# Patient Record
Sex: MALE | Race: WHITE | Employment: FULL TIME | URBAN - NONMETROPOLITAN AREA
[De-identification: names, ages, dates, MRNs, and addresses within clinical notes are randomized per-mention and may not be internally consistent; named-entity substitution may affect disease eponyms.]

---

## 2024-05-15 ENCOUNTER — HOSPITAL ENCOUNTER (EMERGENCY)
Age: 29
Discharge: ANOTHER HOSPITAL | End: 2024-05-15
Attending: EMERGENCY MEDICINE
Payer: COMMERCIAL

## 2024-05-15 ENCOUNTER — APPOINTMENT (OUTPATIENT)
Dept: GENERAL RADIOLOGY | Age: 29
End: 2024-05-15
Payer: COMMERCIAL

## 2024-05-15 VITALS
OXYGEN SATURATION: 99 % | BODY MASS INDEX: 29.4 KG/M2 | SYSTOLIC BLOOD PRESSURE: 140 MMHG | HEIGHT: 71 IN | WEIGHT: 210 LBS | DIASTOLIC BLOOD PRESSURE: 60 MMHG | TEMPERATURE: 98 F | HEART RATE: 71 BPM | RESPIRATION RATE: 16 BRPM

## 2024-05-15 DIAGNOSIS — S67.22XA: Primary | ICD-10-CM

## 2024-05-15 DIAGNOSIS — S69.92XA INJURY OF LEFT HAND, INITIAL ENCOUNTER: ICD-10-CM

## 2024-05-15 PROBLEM — S62.613B: Status: ACTIVE | Noted: 2024-05-15

## 2024-05-15 PROBLEM — S62.615B: Status: ACTIVE | Noted: 2024-05-15

## 2024-05-15 PROBLEM — S62.617B OPEN DISPLACED FRACTURE OF PROXIMAL PHALANX OF LEFT LITTLE FINGER: Status: ACTIVE | Noted: 2024-05-15

## 2024-05-15 LAB
ABO: NORMAL
ALBUMIN SERPL BCG-MCNC: 4.5 G/DL (ref 3.5–5.1)
ALP SERPL-CCNC: 64 U/L (ref 38–126)
ALT SERPL W/O P-5'-P-CCNC: 16 U/L (ref 11–66)
ANION GAP SERPL CALC-SCNC: 13 MEQ/L (ref 8–16)
ANTIBODY SCREEN: NORMAL
APTT PPP: 20.5 SECONDS (ref 22–38)
AST SERPL-CCNC: 20 U/L (ref 5–40)
BASOPHILS ABSOLUTE: 0 THOU/MM3 (ref 0–0.1)
BASOPHILS NFR BLD AUTO: 0.3 %
BILIRUB SERPL-MCNC: 1.7 MG/DL (ref 0.3–1.2)
BUN SERPL-MCNC: 20 MG/DL (ref 7–22)
CALCIUM SERPL-MCNC: 9 MG/DL (ref 8.5–10.5)
CHLORIDE SERPL-SCNC: 104 MEQ/L (ref 98–111)
CO2 SERPL-SCNC: 23 MEQ/L (ref 23–33)
CREAT SERPL-MCNC: 0.7 MG/DL (ref 0.4–1.2)
DEPRECATED RDW RBC AUTO: 35.4 FL (ref 35–45)
EOSINOPHIL NFR BLD AUTO: 0.7 %
EOSINOPHILS ABSOLUTE: 0.1 THOU/MM3 (ref 0–0.4)
ERYTHROCYTE [DISTWIDTH] IN BLOOD BY AUTOMATED COUNT: 12 % (ref 11.5–14.5)
GFR SERPL CREATININE-BSD FRML MDRD: > 90 ML/MIN/1.73M2
GLUCOSE SERPL-MCNC: 101 MG/DL (ref 70–108)
HCT VFR BLD AUTO: 39.1 % (ref 42–52)
HGB BLD-MCNC: 13.8 GM/DL (ref 14–18)
IMM GRANULOCYTES # BLD AUTO: 0.03 THOU/MM3 (ref 0–0.07)
IMM GRANULOCYTES NFR BLD AUTO: 0.3 %
INR PPP: 0.92 (ref 0.85–1.13)
LYMPHOCYTES ABSOLUTE: 1.5 THOU/MM3 (ref 1–4.8)
LYMPHOCYTES NFR BLD AUTO: 15.6 %
MCH RBC QN AUTO: 29.4 PG (ref 26–33)
MCHC RBC AUTO-ENTMCNC: 35.3 GM/DL (ref 32.2–35.5)
MCV RBC AUTO: 83.4 FL (ref 80–94)
MONOCYTES ABSOLUTE: 0.5 THOU/MM3 (ref 0.4–1.3)
MONOCYTES NFR BLD AUTO: 5.6 %
NEUTROPHILS ABSOLUTE: 7.4 THOU/MM3 (ref 1.8–7.7)
NEUTROPHILS NFR BLD AUTO: 77.5 %
NRBC BLD AUTO-RTO: 0 /100 WBC
OSMOLALITY SERPL CALC.SUM OF ELEC: 282.2 MOSMOL/KG (ref 275–300)
PLATELET # BLD AUTO: 221 THOU/MM3 (ref 130–400)
PMV BLD AUTO: 9.5 FL (ref 9.4–12.4)
POTASSIUM SERPL-SCNC: 3.9 MEQ/L (ref 3.5–5.2)
PROT SERPL-MCNC: 7.3 G/DL (ref 6.1–8)
RBC # BLD AUTO: 4.69 MILL/MM3 (ref 4.7–6.1)
RH FACTOR: NORMAL
SODIUM SERPL-SCNC: 140 MEQ/L (ref 135–145)
WBC # BLD AUTO: 9.5 THOU/MM3 (ref 4.8–10.8)

## 2024-05-15 PROCEDURE — 86901 BLOOD TYPING SEROLOGIC RH(D): CPT

## 2024-05-15 PROCEDURE — 36415 COLL VENOUS BLD VENIPUNCTURE: CPT

## 2024-05-15 PROCEDURE — 85610 PROTHROMBIN TIME: CPT

## 2024-05-15 PROCEDURE — 2580000003 HC RX 258: Performed by: EMERGENCY MEDICINE

## 2024-05-15 PROCEDURE — 96365 THER/PROPH/DIAG IV INF INIT: CPT

## 2024-05-15 PROCEDURE — 6360000002 HC RX W HCPCS: Performed by: EMERGENCY MEDICINE

## 2024-05-15 PROCEDURE — 96375 TX/PRO/DX INJ NEW DRUG ADDON: CPT

## 2024-05-15 PROCEDURE — 96376 TX/PRO/DX INJ SAME DRUG ADON: CPT

## 2024-05-15 PROCEDURE — 85025 COMPLETE CBC W/AUTO DIFF WBC: CPT

## 2024-05-15 PROCEDURE — 73120 X-RAY EXAM OF HAND: CPT

## 2024-05-15 PROCEDURE — 86850 RBC ANTIBODY SCREEN: CPT

## 2024-05-15 PROCEDURE — 73100 X-RAY EXAM OF WRIST: CPT

## 2024-05-15 PROCEDURE — 85730 THROMBOPLASTIN TIME PARTIAL: CPT

## 2024-05-15 PROCEDURE — 86900 BLOOD TYPING SEROLOGIC ABO: CPT

## 2024-05-15 PROCEDURE — 80053 COMPREHEN METABOLIC PANEL: CPT

## 2024-05-15 PROCEDURE — 93005 ELECTROCARDIOGRAM TRACING: CPT | Performed by: EMERGENCY MEDICINE

## 2024-05-15 PROCEDURE — 99285 EMERGENCY DEPT VISIT HI MDM: CPT

## 2024-05-15 PROCEDURE — APPNB30 APP NON BILLABLE TIME 0-30 MINS: Performed by: OCCUPATIONAL THERAPIST

## 2024-05-15 PROCEDURE — 6820000003 HC L2 TRAUMA ALERT ACTIVATION: Performed by: SURGERY

## 2024-05-15 PROCEDURE — 90715 TDAP VACCINE 7 YRS/> IM: CPT | Performed by: EMERGENCY MEDICINE

## 2024-05-15 PROCEDURE — 90471 IMMUNIZATION ADMIN: CPT | Performed by: EMERGENCY MEDICINE

## 2024-05-15 RX ORDER — ONDANSETRON 2 MG/ML
4 INJECTION INTRAMUSCULAR; INTRAVENOUS ONCE
Status: COMPLETED | OUTPATIENT
Start: 2024-05-15 | End: 2024-05-15

## 2024-05-15 RX ORDER — 0.9 % SODIUM CHLORIDE 0.9 %
1000 INTRAVENOUS SOLUTION INTRAVENOUS ONCE
Status: COMPLETED | OUTPATIENT
Start: 2024-05-15 | End: 2024-05-15

## 2024-05-15 RX ADMIN — TETANUS TOXOID, REDUCED DIPHTHERIA TOXOID AND ACELLULAR PERTUSSIS VACCINE, ADSORBED 0.5 ML: 5; 2.5; 8; 8; 2.5 SUSPENSION INTRAMUSCULAR at 14:23

## 2024-05-15 RX ADMIN — HYDROMORPHONE HYDROCHLORIDE 1 MG: 1 INJECTION, SOLUTION INTRAMUSCULAR; INTRAVENOUS; SUBCUTANEOUS at 16:27

## 2024-05-15 RX ADMIN — HYDROMORPHONE HYDROCHLORIDE 1 MG: 1 INJECTION, SOLUTION INTRAMUSCULAR; INTRAVENOUS; SUBCUTANEOUS at 14:20

## 2024-05-15 RX ADMIN — ONDANSETRON 4 MG: 2 INJECTION INTRAMUSCULAR; INTRAVENOUS at 14:20

## 2024-05-15 RX ADMIN — WATER 2000 MG: 1 INJECTION INTRAMUSCULAR; INTRAVENOUS; SUBCUTANEOUS at 14:23

## 2024-05-15 RX ADMIN — GENTAMICIN SULFATE 476.4 MG: 40 INJECTION, SOLUTION INTRAMUSCULAR; INTRAVENOUS at 15:16

## 2024-05-15 RX ADMIN — SODIUM CHLORIDE 1000 ML: 9 INJECTION, SOLUTION INTRAVENOUS at 14:25

## 2024-05-15 RX ADMIN — HYDROMORPHONE HYDROCHLORIDE 1 MG: 1 INJECTION, SOLUTION INTRAMUSCULAR; INTRAVENOUS; SUBCUTANEOUS at 15:17

## 2024-05-15 ASSESSMENT — PAIN SCALES - GENERAL: PAINLEVEL_OUTOF10: 7

## 2024-05-15 ASSESSMENT — PAIN DESCRIPTION - ORIENTATION: ORIENTATION: LEFT

## 2024-05-15 ASSESSMENT — ENCOUNTER SYMPTOMS
COLOR CHANGE: 0
NAUSEA: 0
COUGH: 0
ABDOMINAL PAIN: 0
VOMITING: 0
SHORTNESS OF BREATH: 0
EYE PAIN: 0

## 2024-05-15 ASSESSMENT — PAIN DESCRIPTION - LOCATION: LOCATION: HAND

## 2024-05-15 ASSESSMENT — PAIN DESCRIPTION - PAIN TYPE: TYPE: ACUTE PAIN

## 2024-05-15 ASSESSMENT — PAIN - FUNCTIONAL ASSESSMENT: PAIN_FUNCTIONAL_ASSESSMENT: 0-10

## 2024-05-15 NOTE — ED TRIAGE NOTES
Pt reports to ED via [facility] with c/c crush injury to left hand. Pt reportedly had c-coil machine smash into left hand. Weight of crushing object approx 150 lb. Pt left hand mangled. Pt left 4th digit pale in color. Pt reports sensation in left thump and 2nd digit. Pt given 200 mcg fentanyl PTA. Level 1 trauma paged per Dr. Neha saldana.

## 2024-05-15 NOTE — ED PROVIDER NOTES
PATIENT NAME: Jonathan Reyna  MRN: [id number]  : 1995  SAWYER: 5/15/2024    I performed a history and physical examination of the patient and discussed management with the Resident. I reviewed the Resident's note and agree with the documented findings and plan of care. Any areas of disagreement are noted on the chart. I was personally present for the key portions of any procedures and have documented in the chart those procedures where I was not present during the key portions. I have reviewed the emergency nurses triage note and agree with the chief complaint, past medical history, past surgical history, allergies, medications, social and family history as documented unless otherwise noted below.    MEDICAL DEDISION MAKING (MDM)     Jonathan Reyna is a 28 y.o. male who presents to Emergency Department with Hand Injury (LEFT)     Patient presents to ED by EMS for a left hand crush injury which happened just PTA.  According to him, his hand was smashed by the pilon of the C coiling machine.  Left hand is mangled see below picture.  Pain is rated at 10/10.  No active bleeding.    Physical exam: AVSS, heart and lungs unremarkable.  Soft abdomen.  Left hand exam as below picture.  Of note left thumb is spared without injury, left fourth finger is pale and cool.    Left hand x-ray: Near complete amputation injuries involving the proximal left third through fifth digits with associated comminuted fractures as detailed above. Scattered punctate radiopaque foreign densities are also seen throughout all 5 digits of the left hand.    Level 1 trauma activation.  Discussed with Ortho immediately.  Ortho reviewed patient's pictures and x-rays, unfortunately, we have no hand surgery available at [facility] today.  Recommending [facility] transfer.    ED treatment include NPO, multiple IV medication for pain control, tetanus booster, IV antibiotics and brief left hand immobilization on a hard board.    Accepted by [facility] ED as an ED-ED transfer  recognition program.  Efforts were made to edit the dictations but occasionally words aremis-transcribed.)    MD Neha Payne Jian, MD  05/15/24 [id number]

## 2024-05-15 NOTE — CONSULTS
Trauma Consult     Patient:  Jonathan Reyna  Admit date: 5/15/2024   YOB: 1995 Date of Evaluation: 5/15/2024  MRN: [id number]  Acct: [id number]    Injury Date:5/15/24  Injury time:afternoon  PCP: No primary care provider on file.   Referring physician: Dr. Solomon    Time of Trauma Surgeon Notification:  14:08  Time of JOSE Arrival: 14:14  Time of Trauma Surgeon Arrival:  14:17    Assessment:    Active Problems:    Crushing injury of hand and fingers, left, initial encounter    Open displaced fracture of proximal phalanx of left little finger    Displaced fracture of proximal phalanx of left ring finger, initial encounter for open fracture    Displaced fracture of proximal phalanx of left middle finger, initial encounter for open fracture  Resolved Problems:    * No resolved hospital problems. *    Plan:    Pt seen and worked up as a level 1 trauma. Refer to MDM below for any imaging, procedures or treatments provided. Pt sustained injuries including traumatic crush injury of left non dominant hand to 2-5 digits, including near amputation injuries to proximal and mid 3-5 digits, comminuted displaced open fractures to the 3-4 digits. Ortho was immediately contacted by ER staff and with no hand surgeon on call they recommend transfer to [facility]. ER to assist with helicopter/ transport to accepting hospital. Pt requires specialist care beyond this facility. Pt stabilized with pain control, IVF, IV abx, tetanus splinting and hemorrhage control interventions.     Activation:    [x] Level I (Trauma Alert)   [] Level II (Injury Call)   [] Level III (Trauma Consult)   [] Downgraded (Time: )   Mode of Arrival: EMS transportation  Referring Facility:   Loss of Consciousness [x]No []Yes[]Unknown  Duration(min):  Mechanism of Injury:  []Motor Vehicle crash   []Single Vehicle [] []Passenger []Scene Fatality []Front Seat  []Restrained   []Air Bag Deployed   []Ejected []Rollover []Pedestrian []Trapped   Type of  intervention. Pt with injury complexity beyond ortho on call capabilities. While in ER pt received dilaudid, tetanus update, IV ancef, IV gentamicin and IVF. Splint and dressing was applied, with minimal success ER physician attempted to reduce 3rd finger which appears to be NV intact as pt can move 3rd digit and tendon is pulling into flexion. Pain adequately controlled with dilaudid. Patient to be life flighted. Later via nurse notified pt is going via MICU as life flight declined for weather.     Radiology:     XR WRIST LEFT (2 VIEWS)   Final Result      Left hand:   1. Near complete amputation injuries involving the proximal left third through   fifth digits with associated comminuted fractures as detailed above. Scattered   punctate radiopaque foreign densities are also seen throughout all 5 digits of   the left hand.      Left wrist:   1. No acute fracture of the left wrist is seen.      **This report has been created using voice recognition software.  It may contain   minor errors which are inherent in voice recognition technology.**      XR HAND LEFT (2 VIEWS)   Final Result      Left hand:   1. Near complete amputation injuries involving the proximal left third through   fifth digits with associated comminuted fractures as detailed above. Scattered   punctate radiopaque foreign densities are also seen throughout all 5 digits of   the left hand.      Left wrist:   1. No acute fracture of the left wrist is seen.      **This report has been created using voice recognition software.  It may contain   minor errors which are inherent in voice recognition technology.**        Fast Exam: No      Total time spent in care of patient:  35 minutes collectively between subjective/objective examination, chart review, documentation, clinical reasoning and discussion with attending regarding plan/interval changes.    Patient seen and examined independently by me 5/16/2024     I personally supervised the PA/NP in the

## 2024-05-16 LAB
EKG ATRIAL RATE: 74 BPM
EKG P AXIS: 54 DEGREES
EKG P-R INTERVAL: 152 MS
EKG Q-T INTERVAL: 370 MS
EKG QRS DURATION: 82 MS
EKG QTC CALCULATION (BAZETT): 410 MS
EKG R AXIS: 52 DEGREES
EKG T AXIS: 29 DEGREES
EKG VENTRICULAR RATE: 74 BPM

## 2024-05-16 PROCEDURE — 93010 ELECTROCARDIOGRAM REPORT: CPT | Performed by: INTERNAL MEDICINE

## 2025-04-30 ENCOUNTER — OFFICE VISIT (OUTPATIENT)
Dept: PRIMARY CARE CLINIC | Age: 30
End: 2025-04-30
Payer: COMMERCIAL

## 2025-04-30 VITALS
SYSTOLIC BLOOD PRESSURE: 116 MMHG | BODY MASS INDEX: 34.55 KG/M2 | WEIGHT: 246.8 LBS | HEIGHT: 71 IN | DIASTOLIC BLOOD PRESSURE: 66 MMHG | HEART RATE: 66 BPM | OXYGEN SATURATION: 97 %

## 2025-04-30 DIAGNOSIS — Z13.6 ENCOUNTER FOR LIPID SCREENING FOR CARDIOVASCULAR DISEASE: ICD-10-CM

## 2025-04-30 DIAGNOSIS — F90.0 ATTENTION DEFICIT HYPERACTIVITY DISORDER (ADHD), PREDOMINANTLY INATTENTIVE TYPE: Primary | ICD-10-CM

## 2025-04-30 DIAGNOSIS — Z13.220 ENCOUNTER FOR LIPID SCREENING FOR CARDIOVASCULAR DISEASE: ICD-10-CM

## 2025-04-30 PROCEDURE — 80305 DRUG TEST PRSMV DIR OPT OBS: CPT | Performed by: PHYSICIAN ASSISTANT

## 2025-04-30 PROCEDURE — G8417 CALC BMI ABV UP PARAM F/U: HCPCS | Performed by: PHYSICIAN ASSISTANT

## 2025-04-30 PROCEDURE — G8427 DOCREV CUR MEDS BY ELIG CLIN: HCPCS | Performed by: PHYSICIAN ASSISTANT

## 2025-04-30 PROCEDURE — 1036F TOBACCO NON-USER: CPT | Performed by: PHYSICIAN ASSISTANT

## 2025-04-30 PROCEDURE — 99204 OFFICE O/P NEW MOD 45 MIN: CPT | Performed by: PHYSICIAN ASSISTANT

## 2025-04-30 RX ORDER — ALPRAZOLAM 0.5 MG
0.5 TABLET ORAL ONCE
COMMUNITY
Start: 2024-06-05 | End: 2025-04-30

## 2025-04-30 RX ORDER — DEXTROAMPHETAMINE SACCHARATE, AMPHETAMINE ASPARTATE MONOHYDRATE, DEXTROAMPHETAMINE SULFATE AND AMPHETAMINE SULFATE 2.5; 2.5; 2.5; 2.5 MG/1; MG/1; MG/1; MG/1
10 CAPSULE, EXTENDED RELEASE ORAL DAILY
Qty: 30 CAPSULE | Refills: 0 | Status: SHIPPED | OUTPATIENT
Start: 2025-04-30 | End: 2025-05-30

## 2025-04-30 RX ORDER — IBUPROFEN 200 MG
200 TABLET ORAL EVERY 6 HOURS PRN
COMMUNITY
End: 2025-04-30

## 2025-04-30 RX ORDER — FERROUS SULFATE 324(65)MG
324 TABLET, DELAYED RELEASE (ENTERIC COATED) ORAL
COMMUNITY
End: 2025-04-30

## 2025-04-30 RX ORDER — GABAPENTIN 300 MG/1
300 CAPSULE ORAL 3 TIMES DAILY
COMMUNITY
Start: 2024-07-08 | End: 2025-04-30

## 2025-04-30 RX ORDER — OXYCODONE HYDROCHLORIDE 5 MG/1
5 TABLET ORAL EVERY 4 HOURS PRN
COMMUNITY
Start: 2024-12-20 | End: 2025-04-30

## 2025-04-30 RX ORDER — ONDANSETRON 4 MG/1
4 TABLET, FILM COATED ORAL EVERY 8 HOURS PRN
COMMUNITY
Start: 2024-12-20 | End: 2025-04-30

## 2025-04-30 RX ORDER — MULTIVITAMIN WITH IRON
1 TABLET ORAL DAILY
COMMUNITY

## 2025-04-30 RX ORDER — ASPIRIN 81 MG/1
81 TABLET ORAL DAILY
COMMUNITY
Start: 2024-05-31 | End: 2025-04-30

## 2025-04-30 SDOH — ECONOMIC STABILITY: FOOD INSECURITY: WITHIN THE PAST 12 MONTHS, THE FOOD YOU BOUGHT JUST DIDN'T LAST AND YOU DIDN'T HAVE MONEY TO GET MORE.: NEVER TRUE

## 2025-04-30 SDOH — ECONOMIC STABILITY: FOOD INSECURITY: WITHIN THE PAST 12 MONTHS, YOU WORRIED THAT YOUR FOOD WOULD RUN OUT BEFORE YOU GOT MONEY TO BUY MORE.: NEVER TRUE

## 2025-04-30 ASSESSMENT — PATIENT HEALTH QUESTIONNAIRE - PHQ9
1. LITTLE INTEREST OR PLEASURE IN DOING THINGS: NOT AT ALL
2. FEELING DOWN, DEPRESSED OR HOPELESS: NOT AT ALL
SUM OF ALL RESPONSES TO PHQ QUESTIONS 1-9: 0

## 2025-04-30 ASSESSMENT — ENCOUNTER SYMPTOMS
ABDOMINAL PAIN: 0
VOMITING: 0
EYE PAIN: 0
SINUS PAIN: 0
NAUSEA: 0
RHINORRHEA: 0
BACK PAIN: 0
CONSTIPATION: 0
DIARRHEA: 0
COUGH: 0
SHORTNESS OF BREATH: 0

## 2025-04-30 NOTE — PROGRESS NOTES
[facility]  [facility]  [address] [address]  Dept: [phone]  Dept Fax: [phone]    Jonathan Reyna is a 29 y.o. male who presents today for his medical conditions/complaints as noted below.    Chief Complaint   Patient presents with    New Patient     Patient in office to establish care.   Last PCP-peds dr menendez who.  LOV-10 years ago  Last Labs-2024  Specialists-marvin, Dr Whipple  Concerns-None       HPI:     Patient presents to the office to establish care.  No recent PCP.  Presently, patient does not take any prescription medication for chronic medical illness.  In the past was diagnosed with ADHD.    Last May he sustained crush injury to his left hand at work and has been following with a hand specialist and therapist at [facility].  Sustained amputations to  3rd and 4th fingers and fractures to the remainder.  He follows appropriately with specialist.  This is [employer].    Primary concern today is discussing ADHD.  He was initially diagnosed with ADHD at age 6.  This was treated until he graduated high school.  Once he graduated high school he no longer is taking medication.  In the past was treated with Adderall extended release.  He has several symptoms including easily forgetful, hard time with focus and concentration, intermittent fidgeting.  Sometimes feels overwhelmed by procrastination and not completing task.  Occasionally is anxious.  Denies significant depression.  No thoughts of harming himself or others.  No other prior medication.    Otherwise no acute changes or concerns.  We discussed health maintenance and screenings.  Plan update screening labs.        No results found for: \"LABA1C\"          ( goal A1C is < 7)   No components found for: \"LABMICR\"  No components found for: \"LDLCHOLESTEROL\", \"LDLCALC\"    (goal LDL is <100)   AST (U/L)   Date Value   05/15/2024 20     ALT (U/L)   Date Value   05/15/2024 16     BUN (mg/dL)   Date Value

## 2025-05-01 ENCOUNTER — HOSPITAL ENCOUNTER (OUTPATIENT)
Age: 30
Setting detail: SPECIMEN
Discharge: HOME | End: 2025-05-01

## 2025-05-01 DIAGNOSIS — Z13.6 ENCOUNTER FOR LIPID SCREENING FOR CARDIOVASCULAR DISEASE: ICD-10-CM

## 2025-05-01 DIAGNOSIS — Z13.220 ENCOUNTER FOR LIPID SCREENING FOR CARDIOVASCULAR DISEASE: ICD-10-CM

## 2025-05-01 LAB
ALBUMIN SERPL-MCNC: 4.7 G/DL (ref 3.5–5.2)
ALBUMIN/GLOB SERPL: 1.9 {RATIO} (ref 1–2.5)
ALP SERPL-CCNC: 68 U/L (ref 40–129)
ALT SERPL-CCNC: 27 U/L (ref 10–50)
ANION GAP SERPL CALCULATED.3IONS-SCNC: 10 MMOL/L (ref 9–16)
AST SERPL-CCNC: 19 U/L (ref 10–50)
BASOPHILS # BLD: <0.03 K/UL (ref 0–0.2)
BASOPHILS NFR BLD: 0 % (ref 0–2)
BILIRUB SERPL-MCNC: 1.7 MG/DL (ref 0–1.2)
BUN SERPL-MCNC: 16 MG/DL (ref 6–20)
CALCIUM SERPL-MCNC: 9.4 MG/DL (ref 8.6–10.4)
CHLORIDE SERPL-SCNC: 102 MMOL/L (ref 98–107)
CHOLEST SERPL-MCNC: 187 MG/DL (ref 0–199)
CHOLESTEROL/HDL RATIO: 6.7
CO2 SERPL-SCNC: 28 MMOL/L (ref 20–31)
CREAT SERPL-MCNC: 0.9 MG/DL (ref 0.7–1.2)
EOSINOPHIL # BLD: 0.11 K/UL (ref 0–0.44)
EOSINOPHILS RELATIVE PERCENT: 2 % (ref 1–4)
ERYTHROCYTE [DISTWIDTH] IN BLOOD BY AUTOMATED COUNT: 12.4 % (ref 11.8–14.4)
GFR, ESTIMATED: >90 ML/MIN/1.73M2
GLUCOSE P FAST SERPL-MCNC: 83 MG/DL (ref 74–99)
HCT VFR BLD AUTO: 40.7 % (ref 40.7–50.3)
HDLC SERPL-MCNC: 28 MG/DL
HGB BLD-MCNC: 14.5 G/DL (ref 13–17)
IMM GRANULOCYTES # BLD AUTO: <0.03 K/UL (ref 0–0.3)
IMM GRANULOCYTES NFR BLD: 0 %
LDLC SERPL CALC-MCNC: 103 MG/DL (ref 0–100)
LYMPHOCYTES NFR BLD: 1.74 K/UL (ref 1.1–3.7)
LYMPHOCYTES RELATIVE PERCENT: 36 % (ref 24–43)
MCH RBC QN AUTO: 28.4 PG (ref 25.2–33.5)
MCHC RBC AUTO-ENTMCNC: 35.6 G/DL (ref 28.4–34.8)
MCV RBC AUTO: 79.8 FL (ref 82.6–102.9)
MONOCYTES NFR BLD: 0.42 K/UL (ref 0.1–1.2)
MONOCYTES NFR BLD: 9 % (ref 3–12)
NEUTROPHILS NFR BLD: 53 % (ref 36–65)
NEUTS SEG NFR BLD: 2.49 K/UL (ref 1.5–8.1)
NRBC BLD-RTO: 0 PER 100 WBC
PLATELET # BLD AUTO: 280 K/UL (ref 138–453)
PMV BLD AUTO: 9.9 FL (ref 8.1–13.5)
POTASSIUM SERPL-SCNC: 4.5 MMOL/L (ref 3.7–5.3)
PROT SERPL-MCNC: 7.2 G/DL (ref 6.6–8.7)
RBC # BLD AUTO: 5.1 M/UL (ref 4.21–5.77)
RBC # BLD: ABNORMAL 10*6/UL
SODIUM SERPL-SCNC: 140 MMOL/L (ref 136–145)
TRIGL SERPL-MCNC: 280 MG/DL
VLDLC SERPL CALC-MCNC: 56 MG/DL (ref 1–30)
WBC OTHER # BLD: 4.8 K/UL (ref 3.5–11.3)

## 2025-05-02 ENCOUNTER — RESULTS FOLLOW-UP (OUTPATIENT)
Dept: PRIMARY CARE CLINIC | Age: 30
End: 2025-05-02

## 2025-05-30 ENCOUNTER — OFFICE VISIT (OUTPATIENT)
Dept: PRIMARY CARE CLINIC | Age: 30
End: 2025-05-30
Payer: COMMERCIAL

## 2025-05-30 VITALS
SYSTOLIC BLOOD PRESSURE: 108 MMHG | BODY MASS INDEX: 32.9 KG/M2 | HEIGHT: 71 IN | HEART RATE: 77 BPM | WEIGHT: 235 LBS | OXYGEN SATURATION: 97 % | DIASTOLIC BLOOD PRESSURE: 70 MMHG | RESPIRATION RATE: 18 BRPM

## 2025-05-30 DIAGNOSIS — F90.0 ATTENTION DEFICIT HYPERACTIVITY DISORDER (ADHD), PREDOMINANTLY INATTENTIVE TYPE: Primary | ICD-10-CM

## 2025-05-30 DIAGNOSIS — K21.00 GASTROESOPHAGEAL REFLUX DISEASE WITH ESOPHAGITIS WITHOUT HEMORRHAGE: ICD-10-CM

## 2025-05-30 PROCEDURE — 99213 OFFICE O/P EST LOW 20 MIN: CPT | Performed by: PHYSICIAN ASSISTANT

## 2025-05-30 PROCEDURE — 1036F TOBACCO NON-USER: CPT | Performed by: PHYSICIAN ASSISTANT

## 2025-05-30 PROCEDURE — G8417 CALC BMI ABV UP PARAM F/U: HCPCS | Performed by: PHYSICIAN ASSISTANT

## 2025-05-30 PROCEDURE — G8427 DOCREV CUR MEDS BY ELIG CLIN: HCPCS | Performed by: PHYSICIAN ASSISTANT

## 2025-05-30 RX ORDER — DEXTROAMPHETAMINE SACCHARATE, AMPHETAMINE ASPARTATE MONOHYDRATE, DEXTROAMPHETAMINE SULFATE AND AMPHETAMINE SULFATE 2.5; 2.5; 2.5; 2.5 MG/1; MG/1; MG/1; MG/1
10 CAPSULE, EXTENDED RELEASE ORAL DAILY
Qty: 30 CAPSULE | Refills: 0 | Status: CANCELLED | OUTPATIENT
Start: 2025-05-30 | End: 2025-06-29

## 2025-05-30 RX ORDER — DEXTROAMPHETAMINE SACCHARATE, AMPHETAMINE ASPARTATE MONOHYDRATE, DEXTROAMPHETAMINE SULFATE AND AMPHETAMINE SULFATE 3.75; 3.75; 3.75; 3.75 MG/1; MG/1; MG/1; MG/1
15 CAPSULE, EXTENDED RELEASE ORAL DAILY
Qty: 30 CAPSULE | Refills: 0 | Status: SHIPPED | OUTPATIENT
Start: 2025-05-30 | End: 2025-06-29

## 2025-05-30 ASSESSMENT — ENCOUNTER SYMPTOMS
COUGH: 0
DIARRHEA: 0
VOMITING: 0
RHINORRHEA: 0
ABDOMINAL PAIN: 0
BACK PAIN: 0
EYE PAIN: 0
CONSTIPATION: 0
NAUSEA: 0
SINUS PAIN: 0
SHORTNESS OF BREATH: 0

## 2025-05-30 ASSESSMENT — PATIENT HEALTH QUESTIONNAIRE - PHQ9
SUM OF ALL RESPONSES TO PHQ QUESTIONS 1-9: 0
1. LITTLE INTEREST OR PLEASURE IN DOING THINGS: NOT AT ALL
2. FEELING DOWN, DEPRESSED OR HOPELESS: NOT AT ALL
SUM OF ALL RESPONSES TO PHQ QUESTIONS 1-9: 0

## 2025-05-30 NOTE — PROGRESS NOTES
[facility]  [facility]  [address] [address]  Dept: [phone]  Dept Fax: [phone]    Jonathan Reyna is a 29 y.o. male who presents today for his medical conditions/complaints as noted below.    Chief Complaint   Patient presents with    Medication Check    Medication Adjustment     Adderall higher does     throat     Cold feeling after drinking from throat down to chest area        HPI:     Patient presents for a medication follow up for Adderall and is concerned about esophagitis. He has a PMH of multiple crush injuries to the left hand with amputations.    He was previously on Adderall when he was growing up, and restarted this medication 1 month ago at 10 mg daily. He reports no side effects. He notes improvement of symptoms with the medication, but there is still room for improvement. He would like to try an increase to 15 mg Adderall. He notes he was on a higher dose in childhood. He denies concerns with appetite, headache, sleep. Patient is down 10 pounds compared to last visit 1 month ago.    Patient also complains of a cold sensation in his esophagus when he hasn't had anything to eat or drink for awhile. He also has this feeling when he eats spicy food. He notes rare regurgitation, or sour taste in the mouth. He denies dysphagia or odynophagia.  He notes short term relief with tums. He would like to try manage this with dietary changes and not a medication.      ADHD  The problem has been gradually improving. Pertinent negatives include no abdominal pain, arthralgias, chest pain, chills, congestion, coughing, fatigue, fever, headaches, myalgias, nausea, rash or vomiting. The treatment provided moderate relief.       No results found for: \"LABA1C\"          ( goal A1C is < 7)   No components found for: \"LABMICR\"  No components found for: \"LDLCHOLESTEROL\", \"LDLCALC\"    (goal LDL is <100)   AST (U/L)   Date Value   05/01/2025 19     ALT (U/L)   Date Value

## 2025-07-01 DIAGNOSIS — F90.0 ATTENTION DEFICIT HYPERACTIVITY DISORDER (ADHD), PREDOMINANTLY INATTENTIVE TYPE: ICD-10-CM

## 2025-07-01 RX ORDER — DEXTROAMPHETAMINE SACCHARATE, AMPHETAMINE ASPARTATE MONOHYDRATE, DEXTROAMPHETAMINE SULFATE AND AMPHETAMINE SULFATE 3.75; 3.75; 3.75; 3.75 MG/1; MG/1; MG/1; MG/1
15 CAPSULE, EXTENDED RELEASE ORAL DAILY
Qty: 30 CAPSULE | Refills: 0 | Status: SHIPPED | OUTPATIENT
Start: 2025-07-01 | End: 2025-07-31

## 2025-08-01 DIAGNOSIS — F90.0 ATTENTION DEFICIT HYPERACTIVITY DISORDER (ADHD), PREDOMINANTLY INATTENTIVE TYPE: ICD-10-CM

## 2025-08-05 RX ORDER — DEXTROAMPHETAMINE SACCHARATE, AMPHETAMINE ASPARTATE MONOHYDRATE, DEXTROAMPHETAMINE SULFATE AND AMPHETAMINE SULFATE 3.75; 3.75; 3.75; 3.75 MG/1; MG/1; MG/1; MG/1
15 CAPSULE, EXTENDED RELEASE ORAL DAILY
Qty: 30 CAPSULE | Refills: 0 | Status: SHIPPED | OUTPATIENT
Start: 2025-08-05 | End: 2025-09-04

## 2025-08-29 ENCOUNTER — OFFICE VISIT (OUTPATIENT)
Dept: PRIMARY CARE CLINIC | Age: 30
End: 2025-08-29
Payer: COMMERCIAL

## 2025-08-29 VITALS — SYSTOLIC BLOOD PRESSURE: 118 MMHG | OXYGEN SATURATION: 98 % | HEART RATE: 61 BPM | DIASTOLIC BLOOD PRESSURE: 68 MMHG

## 2025-08-29 DIAGNOSIS — F90.0 ATTENTION DEFICIT HYPERACTIVITY DISORDER (ADHD), PREDOMINANTLY INATTENTIVE TYPE: Primary | ICD-10-CM

## 2025-08-29 DIAGNOSIS — E78.5 DYSLIPIDEMIA: ICD-10-CM

## 2025-08-29 PROCEDURE — 99213 OFFICE O/P EST LOW 20 MIN: CPT | Performed by: PHYSICIAN ASSISTANT

## 2025-08-29 PROCEDURE — 1036F TOBACCO NON-USER: CPT | Performed by: PHYSICIAN ASSISTANT

## 2025-08-29 PROCEDURE — G8417 CALC BMI ABV UP PARAM F/U: HCPCS | Performed by: PHYSICIAN ASSISTANT

## 2025-08-29 PROCEDURE — G8427 DOCREV CUR MEDS BY ELIG CLIN: HCPCS | Performed by: PHYSICIAN ASSISTANT

## 2025-08-29 ASSESSMENT — ENCOUNTER SYMPTOMS
DIARRHEA: 0
RHINORRHEA: 0
SINUS PAIN: 0
BACK PAIN: 0
VOMITING: 0
COUGH: 0
EYE PAIN: 0
ABDOMINAL PAIN: 0
SHORTNESS OF BREATH: 0
CONSTIPATION: 0
NAUSEA: 0